# Patient Record
Sex: FEMALE | URBAN - METROPOLITAN AREA
[De-identification: names, ages, dates, MRNs, and addresses within clinical notes are randomized per-mention and may not be internally consistent; named-entity substitution may affect disease eponyms.]

---

## 2022-02-13 ENCOUNTER — HOSPITAL ENCOUNTER (EMERGENCY)
Facility: CLINIC | Age: 32
Discharge: HOME | End: 2022-02-13
Attending: EMERGENCY MEDICINE | Admitting: EMERGENCY MEDICINE

## 2022-02-13 VITALS
DIASTOLIC BLOOD PRESSURE: 63 MMHG | WEIGHT: 175 LBS | HEIGHT: 62 IN | RESPIRATION RATE: 18 BRPM | OXYGEN SATURATION: 93 % | HEART RATE: 74 BPM | SYSTOLIC BLOOD PRESSURE: 109 MMHG | BODY MASS INDEX: 32.2 KG/M2 | TEMPERATURE: 97.8 F

## 2022-02-13 DIAGNOSIS — K04.7 DENTAL ABSCESS: ICD-10-CM

## 2022-02-13 PROCEDURE — 64400 NJX AA&/STRD TRIGEMINAL NRV: CPT

## 2022-02-13 PROCEDURE — 99283 EMERGENCY DEPT VISIT LOW MDM: CPT

## 2022-02-13 PROCEDURE — 250N000013 HC RX MED GY IP 250 OP 250 PS 637: Performed by: EMERGENCY MEDICINE

## 2022-02-13 RX ORDER — OXYCODONE HYDROCHLORIDE 5 MG/1
5 TABLET ORAL ONCE
Status: COMPLETED | OUTPATIENT
Start: 2022-02-13 | End: 2022-02-13

## 2022-02-13 RX ORDER — NAPROXEN 250 MG/1
500 TABLET ORAL ONCE
Status: COMPLETED | OUTPATIENT
Start: 2022-02-13 | End: 2022-02-13

## 2022-02-13 RX ADMIN — AMOXICILLIN AND CLAVULANATE POTASSIUM 1 TABLET: 875; 125 TABLET, FILM COATED ORAL at 23:18

## 2022-02-13 RX ADMIN — NAPROXEN 500 MG: 250 TABLET ORAL at 22:25

## 2022-02-13 ASSESSMENT — MIFFLIN-ST. JEOR: SCORE: 1472.04

## 2022-02-13 ASSESSMENT — ENCOUNTER SYMPTOMS: FEVER: 0

## 2022-02-14 ENCOUNTER — HOSPITAL ENCOUNTER (EMERGENCY)
Facility: CLINIC | Age: 32
Discharge: HOME | End: 2022-02-14
Attending: EMERGENCY MEDICINE | Admitting: EMERGENCY MEDICINE

## 2022-02-14 ENCOUNTER — NURSE TRIAGE (OUTPATIENT)
Dept: NURSING | Facility: CLINIC | Age: 32
End: 2022-02-14

## 2022-02-14 VITALS
BODY MASS INDEX: 32.01 KG/M2 | SYSTOLIC BLOOD PRESSURE: 131 MMHG | RESPIRATION RATE: 20 BRPM | WEIGHT: 175 LBS | TEMPERATURE: 98.3 F | HEART RATE: 72 BPM | OXYGEN SATURATION: 99 % | DIASTOLIC BLOOD PRESSURE: 86 MMHG

## 2022-02-14 DIAGNOSIS — K03.2 DENTAL EROSION: ICD-10-CM

## 2022-02-14 DIAGNOSIS — K02.9 DENTAL CARIES: ICD-10-CM

## 2022-02-14 DIAGNOSIS — K08.89 PAIN, DENTAL: ICD-10-CM

## 2022-02-14 PROCEDURE — 99283 EMERGENCY DEPT VISIT LOW MDM: CPT

## 2022-02-14 PROCEDURE — 250N000013 HC RX MED GY IP 250 OP 250 PS 637: Performed by: EMERGENCY MEDICINE

## 2022-02-14 RX ORDER — OXYCODONE HYDROCHLORIDE 5 MG/1
5 TABLET ORAL ONCE
Status: COMPLETED | OUTPATIENT
Start: 2022-02-14 | End: 2022-02-14

## 2022-02-14 RX ORDER — OXYCODONE HYDROCHLORIDE 5 MG/1
5 TABLET ORAL EVERY 6 HOURS PRN
Qty: 5 TABLET | Refills: 0 | Status: SHIPPED | OUTPATIENT
Start: 2022-02-14 | End: 2022-02-17

## 2022-02-14 RX ADMIN — OXYCODONE HYDROCHLORIDE 5 MG: 5 TABLET ORAL at 04:42

## 2022-02-14 NOTE — ED TRIAGE NOTES
"Reporting pain to tooth that \"broke off\" approx 1 1 /2 months ago  Denies any facial swelling  Also reporting earache to rt ear  "

## 2022-02-14 NOTE — DISCHARGE INSTRUCTIONS
Please follow-up with your dentist as soon as possible for removal of the infected tooth.    Take antibiotics to completion.    Use ibuprofen 600 mg every 6 hours and alternate with Tylenol 1000 mg every 6 hours as needed for pain.

## 2022-02-14 NOTE — TELEPHONE ENCOUNTER
Was at ED an hour ago for dental abscess  They numbed her gums and prescribed Naproxen and amoxicillin     Nothing is helping   The pain is throbbing     Wondering if she should come back    Advised that it was her choice, but that likely could not do much more for her. Following up with a dentist first thing in the morning would be more beneficial. Patient is agreeable     COVID 19 Nurse Triage Plan/Patient Instructions    Please be aware that novel coronavirus (COVID-19) may be circulating in the community. If you develop symptoms such as fever, cough, or SOB or if you have concerns about the presence of another infection including coronavirus (COVID-19), please contact your health care provider or visit [email or web address].     Disposition/Instructions    Home care recommended. Follow home care protocol based instructions.    Thank you for taking steps to prevent the spread of this virus.  o Limit your contact with others.  o Wear a simple mask to cover your cough.  o Wash your hands well and often.    Resources    [facility]: About COVID-19: [email or web address]    CDC: What to Do If You're Sick: www.cdc.gov/coronavirus/2019-ncov/about/steps-when-sick.html    CDC: Ending Home Isolation: www.cdc.gov/coronavirus/2019-ncov/hcp/disposition-in-home-patients.html     CDC: Caring for Someone: www.cdc.gov/coronavirus/2019-ncov/if-you-are-sick/care-for-someone.html     [facility]: Interim Guidance for Hospital Discharge to Home: www.health.[facility].[place]/diseases/coronavirus/hcp/hospdischarge.pdf    [facility] clinical trials (COVID-19 research studies): clinicalaffairs.[facility]-clinical-trials     Below are the COVID-19 hotlines at the [facility] ([facility]). Interpreters are available.   o For health questions: Call 907-949-7585 or 1-118.485.1160 (7 a.m. to 7 p.m.)  o For questions about schools and childcare: Call 354-494-3072 or 1-610.964.9759 (7 a.m. to 7 p.m.)  "        Reason for Disposition    Toothache present > 24 hours    Additional Information    Negative: Shock suspected (e.g., cold/pale/clammy skin, too weak to stand, low BP, rapid pulse)    Negative: [1] Similar pain previously AND [2] it was from \"heart attack\"    Negative: [1] Similar pain previously AND [2] it was from \"angina\" AND [3] not relieved by nitroglycerin    Negative: Sounds like a life-threatening emergency to the triager    Negative: Chest pain    Negative: Toothache followed tooth injury    Negative: Toothache or mouth pain after tooth extraction    Negative: Canker sore (i.e., aphthous ulcer)    Negative: Tongue is very swollen and tender    Negative: [1] Face is swollen AND [2] fever    Negative: Patient sounds very sick or weak to the triager    Negative: [1] SEVERE pain (e.g., excruciating, unable to do any normal activities) AND [2] not improved 2 hours after pain medicine    Negative: Face is very swollen    Negative: Fever    Negative: [1] Face is swollen AND [2] no fever    Protocols used: TOOTHACHE-A-    Lisa Lawton RN on 2/14/2022 at 1:03 AM      "

## 2022-02-14 NOTE — ED PROVIDER NOTES
NAME: Leni Crum  AGE: 31 year old female  YOB: 1990  MRN: [id number]  EVALUATION DATE & TIME: No admission date for patient encounter.    PCP: No Ref-Primary, Physician    ED PROVIDER: Watson Watkins M.D.      Chief Complaint   Patient presents with     Dental Pain     FINAL IMPRESSION:  1. Pain, dental    2. Dental caries    3. Dental erosion      MEDICAL DECISION MAKIN:31 AM I met with the patient, obtained history, performed an initial exam, and discussed options and plan for diagnostics and treatment here in the ED.   4:39 AM Patient to be discharged by RN.    Patient was clinically assessed and consented to treatment. After assessment, medical decision making and workup were discussed with the patient. The patient was agreeable to plan for testing, workup, and treatment.  Pertinent Labs & Imaging studies reviewed. (See chart for details)     Leni Crum is a 31 year old female who presents with dental pain.   Differential diagnosis includes but not limited to dental abscess, dental caries, gingivitis, facial cellulitis.  Patient seen earlier for dental pain and given dental block.  She was discharged with prescription for antibiotics and pain control.  Patient at that time had refused stronger pain medication as she reported it makes her feel out of it.  Patient however going back as the dental block wore off and pain came back.  Patient has dental decay down into the pulp on the right lower first molar.  She does have a filling there that is still in place but decay has surrounded it and gone underneath it.  Tooth is tender but there is no signs of facial swelling or abscess.  No fluctuance felt along the gingiva that could be drained.  I discussed with patient repeat block however again this would only last for short while and she reports a previous block there to wear off pretty quickly.  She would like to try stronger pain medication so that she can make it to the dentist this  week.  Patient is visiting from [place] while working here in [place] and will be given list of walk-in clinics if able to be found and resources.  Patient comfortable with plan and will be given strong pain medication to go home with.  She also has antibiotics and naproxen as well.  Patient will be discharged.    0 minutes of critical care time    MEDICATIONS GIVEN IN THE EMERGENCY:  Medications   oxyCODONE (ROXICODONE) tablet 5 mg (5 mg Oral Given 2/14/22 0442)       NEW PRESCRIPTIONS STARTED AT TODAY'S ER VISIT:  Discharge Medication List as of 2/14/2022  4:36 AM      START taking these medications    Details   oxyCODONE (ROXICODONE) 5 MG tablet Take 1 tablet (5 mg) by mouth every 6 hours as needed for breakthrough pain or pain, Disp-5 tablet, R-0, Local Print                =================================================================    HPI    Patient information was obtained from: Patient    Use of : N/A       Leni Crum is a 31 year old female with no past medical history who presents to the ED via walk-in for the evaluation of dental pain.    Patient reports she broke right lower molar about a month ago. Yesterday evening, she developed pain in right lower molar. Patient was seen in the ED and had a dental block performed with relief. Tonight, patient reports that after the dental block wore off, she notes pain in right lower molar than has increased in severity compared to initial onset. Patient has taken Ibuprofen at 0200, used Oragel and vanilla extract, without relief.     Per chart review, patient was seen at [facility] on 2/13/22 for dental abscess. There is both a broken and infected tooth. There is no drainable abscess at the apex of the tooth however there is pus draining from the tooth itself. She declined anything additional to Tylenol and ibuprofen for pain management. Apical [place] on [place] tooth with resolution of pain afterwards. Patient  discharged home.    REVIEW OF SYSTEMS   Review of Systems   HENT: Positive for dental problem (pain in right lower molar).    All other systems reviewed and are negative.     PAST MEDICAL HISTORY:  History reviewed. No pertinent past medical history.    PAST SURGICAL HISTORY:  History reviewed. No pertinent surgical history.    CURRENT MEDICATIONS:    No current facility-administered medications for this encounter.    Current Outpatient Medications:      oxyCODONE (ROXICODONE) 5 MG tablet, Take 1 tablet (5 mg) by mouth every 6 hours as needed for breakthrough pain or pain, Disp: 5 tablet, Rfl: 0     amoxicillin-clavulanate (AUGMENTIN) 875-125 MG tablet, Take 1 tablet by mouth 2 times daily for 7 days, Disp: 14 tablet, Rfl: 0    ALLERGIES:  No Known Allergies    FAMILY HISTORY:  History reviewed. No pertinent family history.    SOCIAL HISTORY:   Social History     Socioeconomic History     Marital status: Single     Spouse name: Not on file     Number of children: Not on file     Years of education: Not on file     Highest education level: Not on file   Occupational History     Not on file   Tobacco Use     Smoking status: Not on file     Smokeless tobacco: Not on file   Substance and Sexual Activity     Alcohol use: Not on file     Drug use: Not on file     Sexual activity: Not on file   Other Topics Concern     Not on file   Social History Narrative     Not on file     Social Determinants of Health     Financial Resource Strain: Not on file   Food Insecurity: Not on file   Transportation Needs: Not on file   Physical Activity: Not on file   Stress: Not on file   Social Connections: Not on file   Intimate Partner Violence: Not on file   Housing Stability: Not on file       PHYSICAL EXAM:    Vitals: /86   Pulse 72   Temp 98.3  F (36.8  C) (Oral)   Resp 20   Wt 79.4 kg (175 lb)   LMP 02/06/2022   SpO2 99%   BMI 32.01 kg/m     Physical Exam  Vitals and nursing note reviewed.   Constitutional:        General: She is not in acute distress.     Appearance: Normal appearance. She is normal weight. She is not ill-appearing or toxic-appearing.   HENT:      Head: Normocephalic and atraumatic.        Nose: Nose normal.      Mouth/Throat:      Mouth: Mucous membranes are moist.      Dentition: Abnormal dentition. Does not have dentures. Dental tenderness and dental caries present. No gingival swelling, dental abscesses or gum lesions.     Pulmonary:      Effort: No respiratory distress.   Musculoskeletal:      Cervical back: Normal range of motion and neck supple. No tenderness.   Skin:     General: Skin is warm and dry.      Findings: No erythema.   Neurological:      Mental Status: She is alert.      Cranial Nerves: No cranial nerve deficit.   Psychiatric:         Mood and Affect: Mood normal.           LAB:  All pertinent labs reviewed and interpreted.  Labs Ordered and Resulted from Time of ED Arrival to Time of ED Departure - No data to display    RADIOLOGY:  No orders to display     PROCEDURES:   Procedures       I, Nadege Benton, am serving as a scribe to document services personally performed by Dr. Watson Watkins  based on my observation and the provider's statements to me. IWatson MD attest that Nadege Benton is acting in a scribe capacity, has observed my performance of the services and has documented them in accordance with my direction.      Watson Watkins M.D.  Emergency Medicine  [facility] [place] [facility]     Watson Watkins MD  02/14/22 8642

## 2022-02-14 NOTE — ED TRIAGE NOTES
Right sided dental pain to upper and lower front and back of mouth. States has cavities. States pain in ear and jaw feels swelling present. On right side.

## 2022-02-14 NOTE — ED PROVIDER NOTES
EMERGENCY DEPARTMENT ENCOUNTER      NAME: Leni Crum  AGE: 31 year old female  YOB: 1990  MRN: [id number]  EVALUATION DATE & TIME: 2/13/2022  9:56 PM    PCP: No primary care provider on file.    ED PROVIDER: Connie Miller M.D.      Chief Complaint   Patient presents with     Dental Pain         FINAL IMPRESSION:  1. Dental abscess        MEDICAL DECISION MAKING:    10:14 PM I met with the patient, obtained history, performed an initial exam, and discussed options and plan for diagnostics and treatment here in the ED. PPE worn throughout all patient encounters; N95 mask, safety glasses, and gloves.  11:16 PM Rechecked patient and performed apical block at the base of the infected tooth. She is completely free of pain after procedure. We discussed the plan for discharge and the patient is agreeable. Reviewed supportive cares, symptomatic treatment, outpatient follow up, and reasons to return to the Emergency Department. Patient to be discharged by ED RN.     Pertinent Labs & Imaging studies reviewed. (See chart for details)       Leni Crum is a 31 year old female who presents with pain in the right mandible related to a dental infection. There is no facial edema. Her vital signs are normal and she is without fever. There is both a broken and infected tooth. There is no drainable abscess at the apex of the tooth however there is pus draining from the tooth itself. She does not live in the [facility] and will be headed home on Wednesday so unable to see a dentist until that time. She declined anything additional to Tylenol and ibuprofen for pain management. I did offer her a dental block which she then accepted for relief of her pain.    She tolerated the dental block well and she got complete pain relief following the procedure. She'll get her first dose of antibiotics here and be discharged with a prescription for Augmentin. We discussed ways to continue managing her pain at home and to the  urgency for follow-up in dental clinic as soon as she returns home. We also discussed warning signs and indications to return to the emergency department. She understands these and all discharge instructions.       MEDICATIONS GIVEN IN THE EMERGENCY:  Medications   naproxen (NAPROSYN) tablet 500 mg (500 mg Oral Given 2/13/22 2225)   amoxicillin-clavulanate (AUGMENTIN) 875-125 MG per tablet 1 tablet (1 tablet Oral Given 2/13/22 2318)   oxyCODONE (ROXICODONE) tablet 5 mg (0 mg Oral Return to Cabinet 2/13/22 2331)       NEW PRESCRIPTIONS STARTED AT TODAY'S ER VISIT:  New Prescriptions    AMOXICILLIN-CLAVULANATE (AUGMENTIN) 875-125 MG TABLET    Take 1 tablet by mouth 2 times daily for 7 days          =================================================================    HPI    Patient information was obtained from: patient     Use of : Use of : N/A       Leni Crum is a 31 year old female with no recorded history who presents with dental pain.    Patient broke a tooth about a month ago. She has not seen a dentist since then. Today around 7 pm (~3.5 hours ago), patient began having pain around the tooth. It is located in the back, right, bottom jaw. The pain began to radiate from that site to her right temple and ear. She describes it as a tightness in her ear. She has not had any facial swelling or noticed any foul taste or drainage from the tooth. She denies fever or any additional symptoms at this time. Patient is not from [place] and plans to see a dentist when she returns home in 3 days.    REVIEW OF SYSTEMS   Review of Systems   Constitutional: Negative for fever.   HENT: Positive for dental problem (back of right lower jaw with pain) and ear pain (right sided).    All other systems reviewed and are negative.       PAST MEDICAL HISTORY:  History reviewed. No pertinent past medical history.    PAST SURGICAL HISTORY:  History reviewed. No pertinent surgical history.    CURRENT MEDICATIONS:   "    Current Facility-Administered Medications:      oxyCODONE (ROXICODONE) tablet 5 mg, 5 mg, Oral, Once, Connie Miller MD    Current Outpatient Medications:      amoxicillin-clavulanate (AUGMENTIN) 875-125 MG tablet, Take 1 tablet by mouth 2 times daily for 7 days, Disp: 14 tablet, Rfl: 0    ALLERGIES:  No Known Allergies    FAMILY HISTORY:  History reviewed. No pertinent family history.    SOCIAL HISTORY:   Social History     Tobacco Use     Smoking status: None     Smokeless tobacco: None   Substance Use Topics     Alcohol use: None     Drug use: None        PHYSICAL EXAM:    Vitals: /71   Pulse 60   Temp 97.8  F (36.6  C) (Oral)   Resp 18   Ht 1.575 m (5' 2\")   Wt 79.4 kg (175 lb)   SpO2 100%   BMI 32.01 kg/m     General: Appears uncomfortable. Alert and interactive.  HENT: Broken and abscessed tooth of the right lower mandible, first molar #30. No apical abscess, puss draining directly from tooth. No lymphadenopathy, no facial swelling. Oropharynx without erythema or exudates. MMM.  TMs clear bilaterally.  Eyes: Pupils mid-sized and equally reactive.   Neck: Full AROM.  No midline tenderness to palpation.  Cardiovascular: Regular rate and rhythm. Peripheral pulses 2+ bilaterally.  Chest/Pulmonary: Normal work of breathing. Lung sounds clear and equal throughout, no wheezes or crackles. No chest wall tenderness or deformities.  Abdomen: Soft and nontender  Extremities: Normal ROM of all major joints. No lower extremity edema.   Skin: Warm and dry. Normal skin color.   Neuro: Speech clear. CNs grossly intact. Moves all extremities appropriately. Strength and sensation grossly intact to all extremities.   Psych: Normal affect/mood, cooperative, memory appropriate.       PROCEDURES:   PROCEDURE: Dental Nerve Block   INDICATIONS: Dental pain   PROCEDURE PROVIDER: Dr Connie Miller   SITE: Right apical block (mandibular) to achieve anesthesia of Tooth #30     MEDICATION: 1.6 mL of 0.5% Bupivacaine " with epinephrine   NOTE: The usual  landmarks were identified and the needle was positioned at the base of tooth #30..  Area was aspirated and there was no return of blood.  I then injected the medication into the site.    COMPLICATIONS: Patient tolerated procedure well, without complication           I, Naheed Correa, am serving as a scribe to document services personally performed by Dr. Connie Miller  based on my observation and the provider's statements to me. I, Connie Miller MD attest that Naheed Correa is acting in a scribe capacity, has observed my performance of the services and has documented them in accordance with my direction.      Connie Miller M.D.  Emergency Medicine  [facility] EMERGENCY ROOM  [address] [address]  [phone]  Dept: [phone]         Connie Miller MD  02/14/22 0025